# Patient Record
Sex: MALE | Race: WHITE | NOT HISPANIC OR LATINO | Employment: OTHER | URBAN - NONMETROPOLITAN AREA
[De-identification: names, ages, dates, MRNs, and addresses within clinical notes are randomized per-mention and may not be internally consistent; named-entity substitution may affect disease eponyms.]

---

## 2017-08-20 ENCOUNTER — APPOINTMENT (OUTPATIENT)
Dept: CT IMAGING | Facility: HOSPITAL | Age: 37
End: 2017-08-20

## 2017-08-20 ENCOUNTER — HOSPITAL ENCOUNTER (INPATIENT)
Facility: HOSPITAL | Age: 37
LOS: 2 days | Discharge: HOME | End: 2017-08-22
Attending: PSYCHIATRY & NEUROLOGY | Admitting: PSYCHIATRY & NEUROLOGY

## 2017-08-20 ENCOUNTER — HOSPITAL ENCOUNTER (EMERGENCY)
Facility: HOSPITAL | Age: 37
Discharge: PSYCHIATRIC OR SUBSTANCE USE FACILITY | End: 2017-08-20
Attending: EMERGENCY MEDICINE

## 2017-08-20 VITALS
BODY MASS INDEX: 27.98 KG/M2 | OXYGEN SATURATION: 100 % | DIASTOLIC BLOOD PRESSURE: 63 MMHG | SYSTOLIC BLOOD PRESSURE: 96 MMHG | TEMPERATURE: 98.2 F | HEART RATE: 92 BPM | RESPIRATION RATE: 16 BRPM | WEIGHT: 225 LBS | HEIGHT: 75 IN

## 2017-08-20 DIAGNOSIS — R45.851 SUICIDAL IDEATION: Primary | ICD-10-CM

## 2017-08-20 PROBLEM — F32.A DEPRESSION WITH SUICIDAL IDEATION: Status: ACTIVE | Noted: 2017-08-20

## 2017-08-20 PROBLEM — F31.81 BIPOLAR II DISORDER (HCC): Status: ACTIVE | Noted: 2017-08-20

## 2017-08-20 PROBLEM — F32.A DEPRESSION WITH SUICIDAL IDEATION: Status: RESOLVED | Noted: 2017-08-20 | Resolved: 2017-08-20

## 2017-08-20 LAB
6-ACETYL MORPHINE: NEGATIVE
ALBUMIN SERPL-MCNC: 4.6 G/DL (ref 3.5–5)
ALBUMIN/GLOB SERPL: 1.3 G/DL (ref 1.5–2.5)
ALP SERPL-CCNC: 83 U/L (ref 40–129)
ALT SERPL W P-5'-P-CCNC: 168 U/L (ref 10–44)
AMPHET+METHAMPHET UR QL: NEGATIVE
ANION GAP SERPL CALCULATED.3IONS-SCNC: 8.7 MMOL/L (ref 3.6–11.2)
AST SERPL-CCNC: 55 U/L (ref 10–34)
BARBITURATES UR QL SCN: NEGATIVE
BASOPHILS # BLD AUTO: 0.05 10*3/MM3 (ref 0–0.3)
BASOPHILS NFR BLD AUTO: 0.5 % (ref 0–2)
BENZODIAZ UR QL SCN: NEGATIVE
BILIRUB SERPL-MCNC: 0.4 MG/DL (ref 0.2–1.8)
BILIRUB UR QL STRIP: NEGATIVE
BUN BLD-MCNC: 15 MG/DL (ref 7–21)
BUN/CREAT SERPL: 12.1 (ref 7–25)
BUPRENORPHINE SERPL-MCNC: NEGATIVE NG/ML
CALCIUM SPEC-SCNC: 10 MG/DL (ref 7.7–10)
CANNABINOIDS SERPL QL: NEGATIVE
CHLORIDE SERPL-SCNC: 105 MMOL/L (ref 99–112)
CLARITY UR: CLEAR
CO2 SERPL-SCNC: 28.3 MMOL/L (ref 24.3–31.9)
COCAINE UR QL: NEGATIVE
COLOR UR: YELLOW
CREAT BLD-MCNC: 1.24 MG/DL (ref 0.43–1.29)
DEPRECATED RDW RBC AUTO: 42 FL (ref 37–54)
EOSINOPHIL # BLD AUTO: 0.18 10*3/MM3 (ref 0–0.7)
EOSINOPHIL NFR BLD AUTO: 1.8 % (ref 0–5)
ERYTHROCYTE [DISTWIDTH] IN BLOOD BY AUTOMATED COUNT: 12.6 % (ref 11.5–14.5)
ETHANOL BLD-MCNC: <10 MG/DL
ETHANOL UR QL: <0.01 %
GFR SERPL CREATININE-BSD FRML MDRD: 65 ML/MIN/1.73
GLOBULIN UR ELPH-MCNC: 3.5 GM/DL
GLUCOSE BLD-MCNC: 108 MG/DL (ref 70–110)
GLUCOSE UR STRIP-MCNC: NEGATIVE MG/DL
HCT VFR BLD AUTO: 45.7 % (ref 42–52)
HGB BLD-MCNC: 15.9 G/DL (ref 14–18)
HGB UR QL STRIP.AUTO: NEGATIVE
IMM GRANULOCYTES # BLD: 0.04 10*3/MM3 (ref 0–0.03)
IMM GRANULOCYTES NFR BLD: 0.4 % (ref 0–0.5)
KETONES UR QL STRIP: ABNORMAL
LEUKOCYTE ESTERASE UR QL STRIP.AUTO: NEGATIVE
LYMPHOCYTES # BLD AUTO: 3.54 10*3/MM3 (ref 1–3)
LYMPHOCYTES NFR BLD AUTO: 34.8 % (ref 21–51)
MCH RBC QN AUTO: 32.1 PG (ref 27–33)
MCHC RBC AUTO-ENTMCNC: 34.8 G/DL (ref 33–37)
MCV RBC AUTO: 92.1 FL (ref 80–94)
METHADONE UR QL SCN: NEGATIVE
MONOCYTES # BLD AUTO: 0.58 10*3/MM3 (ref 0.1–0.9)
MONOCYTES NFR BLD AUTO: 5.7 % (ref 0–10)
NEUTROPHILS # BLD AUTO: 5.77 10*3/MM3 (ref 1.4–6.5)
NEUTROPHILS NFR BLD AUTO: 56.8 % (ref 30–70)
NITRITE UR QL STRIP: NEGATIVE
OPIATES UR QL: NEGATIVE
OSMOLALITY SERPL CALC.SUM OF ELEC: 284.5 MOSM/KG (ref 273–305)
OXYCODONE UR QL SCN: NEGATIVE
PCP UR QL SCN: NEGATIVE
PH UR STRIP.AUTO: <=5 [PH] (ref 5–8)
PLATELET # BLD AUTO: 206 10*3/MM3 (ref 130–400)
PMV BLD AUTO: 10.5 FL (ref 6–10)
POTASSIUM BLD-SCNC: 4.3 MMOL/L (ref 3.5–5.3)
PROT SERPL-MCNC: 8.1 G/DL (ref 6–8)
PROT UR QL STRIP: NEGATIVE
RBC # BLD AUTO: 4.96 10*6/MM3 (ref 4.7–6.1)
SODIUM BLD-SCNC: 142 MMOL/L (ref 135–153)
SP GR UR STRIP: 1.02 (ref 1–1.03)
UROBILINOGEN UR QL STRIP: ABNORMAL
WBC NRBC COR # BLD: 10.16 10*3/MM3 (ref 4.5–12.5)

## 2017-08-20 PROCEDURE — 99223 1ST HOSP IP/OBS HIGH 75: CPT | Performed by: PSYCHIATRY & NEUROLOGY

## 2017-08-20 PROCEDURE — 70450 CT HEAD/BRAIN W/O DYE: CPT | Performed by: RADIOLOGY

## 2017-08-20 RX ORDER — METHOCARBAMOL 750 MG/1
750 TABLET, FILM COATED ORAL 4 TIMES DAILY PRN
Status: ON HOLD | COMMUNITY
End: 2017-08-20

## 2017-08-20 RX ORDER — TRAZODONE HYDROCHLORIDE 50 MG/1
100 TABLET ORAL NIGHTLY PRN
Status: DISCONTINUED | OUTPATIENT
Start: 2017-08-20 | End: 2017-08-22 | Stop reason: HOSPADM

## 2017-08-20 RX ORDER — BENZONATATE 100 MG/1
100 CAPSULE ORAL 3 TIMES DAILY PRN
Status: DISCONTINUED | OUTPATIENT
Start: 2017-08-20 | End: 2017-08-22 | Stop reason: HOSPADM

## 2017-08-20 RX ORDER — BENZTROPINE MESYLATE 1 MG/ML
0.5 INJECTION INTRAMUSCULAR; INTRAVENOUS DAILY PRN
Status: DISCONTINUED | OUTPATIENT
Start: 2017-08-20 | End: 2017-08-22 | Stop reason: HOSPADM

## 2017-08-20 RX ORDER — BENZTROPINE MESYLATE 1 MG/1
1 TABLET ORAL DAILY PRN
Status: DISCONTINUED | OUTPATIENT
Start: 2017-08-20 | End: 2017-08-22 | Stop reason: HOSPADM

## 2017-08-20 RX ORDER — METOCLOPRAMIDE HYDROCHLORIDE 5 MG/ML
10 INJECTION INTRAMUSCULAR; INTRAVENOUS ONCE
Status: COMPLETED | OUTPATIENT
Start: 2017-08-20 | End: 2017-08-20

## 2017-08-20 RX ORDER — LURASIDONE HYDROCHLORIDE 40 MG/1
20 TABLET, FILM COATED ORAL
Status: DISCONTINUED | OUTPATIENT
Start: 2017-08-20 | End: 2017-08-22 | Stop reason: HOSPADM

## 2017-08-20 RX ORDER — FAMOTIDINE 20 MG/1
20 TABLET, FILM COATED ORAL 2 TIMES DAILY PRN
Status: DISCONTINUED | OUTPATIENT
Start: 2017-08-20 | End: 2017-08-22 | Stop reason: HOSPADM

## 2017-08-20 RX ORDER — LOPERAMIDE HYDROCHLORIDE 2 MG/1
2 CAPSULE ORAL 4 TIMES DAILY PRN
Status: DISCONTINUED | OUTPATIENT
Start: 2017-08-20 | End: 2017-08-22 | Stop reason: HOSPADM

## 2017-08-20 RX ORDER — ALUMINA, MAGNESIA, AND SIMETHICONE 2400; 2400; 240 MG/30ML; MG/30ML; MG/30ML
15 SUSPENSION ORAL EVERY 6 HOURS PRN
Status: DISCONTINUED | OUTPATIENT
Start: 2017-08-20 | End: 2017-08-22 | Stop reason: HOSPADM

## 2017-08-20 RX ORDER — ACETAMINOPHEN 325 MG/1
650 TABLET ORAL EVERY 4 HOURS PRN
Status: DISCONTINUED | OUTPATIENT
Start: 2017-08-20 | End: 2017-08-22 | Stop reason: HOSPADM

## 2017-08-20 RX ORDER — IBUPROFEN 800 MG/1
800 TABLET ORAL EVERY 6 HOURS PRN
Status: ON HOLD | COMMUNITY
End: 2017-08-20

## 2017-08-20 RX ORDER — ECHINACEA PURPUREA EXTRACT 125 MG
2 TABLET ORAL AS NEEDED
Status: DISCONTINUED | OUTPATIENT
Start: 2017-08-20 | End: 2017-08-22 | Stop reason: HOSPADM

## 2017-08-20 RX ORDER — FLUTICASONE PROPIONATE 50 MCG
2 SPRAY, SUSPENSION (ML) NASAL DAILY
Status: ON HOLD | COMMUNITY
End: 2017-08-20

## 2017-08-20 RX ORDER — SODIUM CHLORIDE 0.9 % (FLUSH) 0.9 %
10 SYRINGE (ML) INJECTION AS NEEDED
Status: DISCONTINUED | OUTPATIENT
Start: 2017-08-20 | End: 2017-08-20 | Stop reason: HOSPADM

## 2017-08-20 RX ORDER — ONDANSETRON 4 MG/1
4 TABLET, FILM COATED ORAL EVERY 6 HOURS PRN
Status: DISCONTINUED | OUTPATIENT
Start: 2017-08-20 | End: 2017-08-22 | Stop reason: HOSPADM

## 2017-08-20 RX ORDER — HYDROXYZINE 50 MG/1
50 TABLET, FILM COATED ORAL EVERY 6 HOURS PRN
Status: DISCONTINUED | OUTPATIENT
Start: 2017-08-20 | End: 2017-08-22 | Stop reason: HOSPADM

## 2017-08-20 RX ORDER — NICOTINE 21 MG/24HR
1 PATCH, TRANSDERMAL 24 HOURS TRANSDERMAL DAILY
Status: DISCONTINUED | OUTPATIENT
Start: 2017-08-20 | End: 2017-08-21

## 2017-08-20 RX ORDER — KETOROLAC TROMETHAMINE 30 MG/ML
30 INJECTION, SOLUTION INTRAMUSCULAR; INTRAVENOUS ONCE
Status: COMPLETED | OUTPATIENT
Start: 2017-08-20 | End: 2017-08-20

## 2017-08-20 RX ADMIN — METOCLOPRAMIDE 10 MG: 5 INJECTION, SOLUTION INTRAMUSCULAR; INTRAVENOUS at 07:32

## 2017-08-20 RX ADMIN — NICOTINE 1 PATCH: 21 PATCH TRANSDERMAL at 18:27

## 2017-08-20 RX ADMIN — ACETAMINOPHEN 650 MG: 325 TABLET ORAL at 18:27

## 2017-08-20 RX ADMIN — KETOROLAC TROMETHAMINE 30 MG: 30 INJECTION, SOLUTION INTRAMUSCULAR at 07:27

## 2017-08-20 RX ADMIN — LURASIDONE HYDROCHLORIDE 20 MG: 40 TABLET, FILM COATED ORAL at 20:49

## 2017-08-20 RX ADMIN — HYDROXYZINE HYDROCHLORIDE 50 MG: 50 TABLET ORAL at 22:07

## 2017-08-20 RX ADMIN — TRAZODONE HYDROCHLORIDE 100 MG: 50 TABLET ORAL at 22:07

## 2017-08-20 RX ADMIN — SODIUM CHLORIDE 1000 ML: 9 INJECTION, SOLUTION INTRAVENOUS at 07:25

## 2017-08-20 NOTE — NURSING NOTE
Patient presented to ER with headache and suicidal thoughts. Has been medically cleared and treated for his headache with fair result.  Patient has diagnosis of Depression, Bipolar and schizophrenia and not under treatment at this time. Has had martial strain recently. Wife in pushing him to give his money to her mother for a down payment on a home that is next to theirs. Patient does not want mother in law to live next to them. 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 His a 7th degree  and 4th degree Jujitsu. Has had a suicide attempt in the past when he was unable to get to a hosptal for mental health treatment. Attempt was years ago, cut his right wrist latterly and tried to pull out his artery. Was found by his mother and then treated. Last hospitalization was more than 5 years ago. Rates Anxiety and Depression both 7/10. Patient shows good insight and states he know he needs to be hospitalized or he will hurt himself. Will not disclose a plan. No substance abuse or alcohol use.Appetite and sleep are poor. Patient is cooperative and has good judgement and insight to his mental illness.

## 2017-08-20 NOTE — NURSING NOTE
Patient to intake per ED staff.  Pockets emptied and through search complete.  Patient searched by intake nurse and witnessed (OPHELIA Caldwell) per ED Policy 100.  Belongings logged on Personal Belongings Inventory Sheet.  Patient placed in hospital attire.  Room swept for any potential safety hazards, room cleared, and patient placed in treatment room.  Patient ready for evaluation.

## 2017-08-20 NOTE — ED NOTES
Patient reports having the same headache for the past 7 days. Patient reports the HA hurts behind his eyes and radiates to the back of his neck. Patient reports he is a  and is used to taking hits to his head, but this pain is different. Patient reports he has had a terrible week and just wants to get help before he hurts himself.  Patient resting quietly on stretcher. Pt AAOx4. No respiratory distress noted. Respirations even and unlabored. Pt denies any needs at this time. Skin PWD. Will continue to monitor and follow plan of care. Bed rails up x 2, bed in lowest position, call light within reach.      Ivett Kitchen RN  08/20/17 4049

## 2017-08-20 NOTE — NURSING NOTE
Patient educated on bathroom location, order to obtain clean-catch urine and provided labeled specimen container.

## 2017-08-20 NOTE — ED PROVIDER NOTES
Subjective   Patient is a 36 y.o. male presenting with mental health disorder and headaches.   History provided by:  Patient   used: No    Mental Health Problem   Presenting symptoms: depression, suicidal thoughts and suicidal threats    Degree of incapacity (severity):  Moderate  Onset quality:  Gradual  Timing:  Intermittent  Progression:  Waxing and waning  Chronicity:  New  Context: stressful life event    Treatment compliance:  Untreated  Relieved by:  Nothing  Worsened by:  Nothing  Ineffective treatments:  None tried  Associated symptoms: anhedonia, anxiety, feelings of worthlessness, headaches, insomnia, irritability and poor judgment    Associated symptoms: no abdominal pain    Headache   Pain location:  L temporal  Quality:  Sharp and stabbing  Radiates to:  Does not radiate  Severity currently:  8/10  Severity at highest:  8/10  Onset quality:  Gradual  Timing:  Constant  Progression:  Worsening  Chronicity:  New  Similar to prior headaches: no    Context: not activity, not exposure to bright light, not caffeine, not defecating, not eating, not intercourse and not loud noise    Context comment:  Head injury 3 weeks ago while fighting martial arts  Relieved by:  Nothing  Worsened by:  Nothing  Ineffective treatments:  None tried  Associated symptoms: no abdominal pain, no back pain, no eye pain, no facial pain, no near-syncope, no neck pain, no sore throat, no swollen glands, no syncope and no tingling    Risk factors: no anger, does not have insomnia and lifestyle not sedentary        Review of Systems   Constitutional: Positive for irritability.   HENT: Negative for sore throat.    Eyes: Negative.  Negative for pain.   Respiratory: Negative.    Cardiovascular: Negative.  Negative for syncope and near-syncope.   Gastrointestinal: Negative.  Negative for abdominal pain.   Endocrine: Negative.    Musculoskeletal: Negative.  Negative for back pain and neck pain.   Skin: Negative.     Allergic/Immunologic: Negative.    Neurological: Positive for headaches.   Hematological: Negative.    Psychiatric/Behavioral: Positive for suicidal ideas. The patient is nervous/anxious and has insomnia.        No past medical history on file.    Allergies   Allergen Reactions   • Penicillins Rash       No past surgical history on file.    No family history on file.    Social History     Social History   • Marital status: Unknown     Spouse name: N/A   • Number of children: N/A   • Years of education: N/A     Social History Main Topics   • Smoking status: Not on file   • Smokeless tobacco: Not on file   • Alcohol use Not on file   • Drug use: Not on file   • Sexual activity: Not on file     Other Topics Concern   • Not on file     Social History Narrative           Objective   Physical Exam   Constitutional: He is oriented to person, place, and time. He appears well-developed and well-nourished.   HENT:   Head: Normocephalic and atraumatic.   Nose: Nose normal.   Mouth/Throat: Oropharynx is clear and moist.   Eyes: EOM are normal. Pupils are equal, round, and reactive to light.   Neck: Normal range of motion. Neck supple.   Cardiovascular: Normal rate, regular rhythm, normal heart sounds and intact distal pulses.    Pulmonary/Chest: Effort normal and breath sounds normal.   Abdominal: Soft. Bowel sounds are normal.   Musculoskeletal: Normal range of motion.   Neurological: He is alert and oriented to person, place, and time.   Skin: Skin is warm and dry.   Psychiatric:   Depressed affect, reports severe depression and suicidal thoughts. Denies specific plan   Nursing note and vitals reviewed.      Procedures         ED Course  ED Course   Comment By Time   Care transferred to JULIAN Mace 08/20 0757                  [facility]    Final diagnoses:   None            Meagan Cummins, JULIAN  08/20/17 0758

## 2017-08-20 NOTE — PLAN OF CARE
Problem: BH Patient Care Overview (Adult)  Goal: Plan of Care Review  Outcome: Ongoing (interventions implemented as appropriate)    08/20/17 1053   Coping/Psychosocial Response Interventions   Plan Of Care Reviewed With patient   Coping/Psychosocial   Patient Agreement with Plan of Care agrees   Outcome Evaluation   Outcome Summary/Follow up Plan new admit see nurse note.

## 2017-08-20 NOTE — H&P
Scrib: Ankita Smi Capobianco  Admission Date: 8/20/2017  3:02 PM 08/20/17      Subjective     Chief Complaint:  Suicidal ideation along with a severe headache     HPI:  Aguilar Best is a 36 y.o. male who was admitted for safety precautions and treatment for suicidal thoughts and fear if he didn't seek help he would hurt someone else. Patient reports he has had a terrible week and just wants to get help before he hurts himself. Patient shares that he has become increasingly more depressed and anxious reporting poor sleep, decreased interest, worthlessness, decreased concentration and focus, feeling hopeless helpless and worthless and had suicidal thoughts and decided seeking treatment would be his best option.  He worries excessively he is very restless and edgy feels keyed up and muscle tension.      He does endorse symptoms of having panic attack since occasionally where he has difficulty catching his breath feeling tightness in his chest fear that he might be dying.    He currently denies: Grandiosity, increased goal-directed activity, mood swings, auditory visual hallucinations, suspiciousness, paranoia, despite previous psychiatric history.  He also denies current symptoms of OCD, any past abuse physical sexual or mental as an adult or child.      Screening questions reveals a history of episodes of increased energy, euphoria, decreased need for sleep, lasting at least 3-4 days in a row.      Past Psych History: [facility] approximately 2005 followed up in [place].  Past diagnoses including depression, bipolar disorder, schizophrenia although he has not been in any treatment for many years.One Suicide Attempt was years ago, cut his right wrist latterly and tried to pull out his artery. Was found by his mother and then treated. Last hospitalization was more than 5 years ago    Substance Abuse: Denies any current illicit substance use.    Family History:  family history includes Bipolar disorder in his  mother.    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  By report his wife is trying to take all of his money and have his mother-in-law moved in next door to them which she is refusing.  Wife in pushing him to give his money to her mother for a down payment on a home that is next to theirs. Patient does not want mother in law to live next to them. 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 His a 7th degree  and 4th degree Jujitsu He shares that he thinks he will leave her because the marriage is not healthy for him.  He reports he has family especially her brother who is supportive of him    Medical/Surgical History:  Past Medical History:   Diagnosis Date   • Anxiety    • Asthma    • Bipolar disorder    • Depression    • Migraine    • Psychiatric illness    • Schizoaffective disorder    • Schizoaffective schizophrenia    • Suicide attempt      Past Surgical History:   Procedure Laterality Date   • APPENDECTOMY     • CHOLECYSTECTOMY         Allergies   Allergen Reactions   • Penicillins Rash     Social History   Substance Use Topics   • Smoking status: Former Smoker   • Smokeless tobacco: Current User     Types: Snuff   • Alcohol use No     Current Medications:   Current Facility-Administered Medications   Medication Dose Route Frequency Provider Last Rate Last Dose   • acetaminophen (TYLENOL) tablet 650 mg  650 mg Oral Q4H PRN Sebas Bledsoe MD       • aluminum-magnesium hydroxide-simethicone (MAALOX MAX) 400-400-40 MG/5ML suspension 15 mL  15 mL Oral Q6H PRN Sebas Bledsoe MD       • benzonatate (TESSALON) capsule 100 mg  100 mg Oral TID PRN Sebas Bledsoe MD       • benztropine (COGENTIN) tablet 1 mg  1 mg Oral Daily PRN Sebas Bledsoe MD        Or   • benztropine (COGENTIN) injection 0.5 mg  0.5 mg Intramuscular Daily PRN Sebas CANCINO  MD Ladi       • famotidine (PEPCID) tablet 20 mg  20 mg Oral BID PRN Sebas Bledsoe MD       • hydrOXYzine (ATARAX) tablet 50 mg  50 mg Oral Q6H PRN Sebas Bledsoe MD       • loperamide (IMODIUM) capsule 2 mg  2 mg Oral 4x Daily PRN Sebas Bledsoe MD       • magnesium hydroxide (MILK OF MAGNESIA) suspension 2400 mg/10mL 10 mL  10 mL Oral Daily PRN Sebas Bledsoe MD       • ondansetron (ZOFRAN) tablet 4 mg  4 mg Oral Q6H PRN Sebas Bledsoe MD       • sodium chloride (OCEAN) nasal spray 2 spray  2 spray Each Nare PRN Sebas Bledsoe MD       • traZODone (DESYREL) tablet 100 mg  100 mg Oral Nightly PRN Sebas Bledsoe MD               Review of Systems - General ROS: negative for - chills, fever or malaise  Ophthalmic ROS: negative for - loss of vision  ENT ROS: negative for - hearing change  Allergy and Immunology ROS: negative for - hives  Hematological and Lymphatic ROS: negative for - bleeding problems  Endocrine ROS: negative for - skin changes  Respiratory ROS: no cough, shortness of breath, or wheezing  Cardiovascular ROS: no chest pain or dyspnea on exertion  Gastrointestinal ROS: no abdominal pain, change in bowel habits, or black or bloody stools  Genito-Urinary ROS: no dysuria, trouble voiding, or hematuria  Musculoskeletal ROS: negative for - gait disturbance    Neurological ROS:     Dermatological ROS: negative for rash    Objective       General Appearance:    Alert, cooperative, in no acute distress   Head:    Normocephalic, without obvious abnormality, atraumatic   Eyes:            Lids and lashes normal, conjunctivae and sclerae normal, no   icterus, no pallor, corneas clear, PERRLA   Ears:    Ears appear intact with no abnormalities noted   Throat:   No oral lesions, no thrush, oral mucosa moist   Neck:   No adenopathy, supple, trachea midline, no thyromegaly, no   carotid bruit, no JVD   Back:     No kyphosis present, no scoliosis present, no skin lesions,      erythema or scars, no  "tenderness to percussion or                   palpation,   range of motion normal   Lungs:     Clear to auscultation,respirations regular, even and                  unlabored    Heart:    Regular rhythm and normal rate, normal S1 and S2, no            murmur, no gallop, no rub, no click   Chest Wall:    No abnormalities observed   Abdomen:     Normal bowel sounds, no masses, no organomegaly, soft        non-tender, non-distended, no guarding, no rebound                tenderness   Rectal:     Deferred   Extremities:   Moves all extremities well, no edema, no cyanosis, no             redness   Pulses:   Pulses palpable and equal bilaterally   Skin:   No bleeding, bruising or rash   Lymph nodes:   No palpable adenopathy     Neurologic:     Cranial nerves 2 - 12 grossly intact, sensation intact, DTR       present and equal bilaterally       Blood pressure 114/78, pulse 86, temperature 97.6 °F (36.4 °C), temperature source Temporal Artery , resp. rate 18, height 75\" (190.5 cm), weight 221 lb 12.8 oz (101 kg), SpO2 97 %.    Mental Status Exam:   Hygiene:   good  Cooperation:  Cooperative  Eye Contact:  Fair  Psychomotor Behavior:  Slow  Affect:  Restricted  Hopelessness: 7  Speech:  Normal  Thought Process:  Linear  Thought Content:  Mood incongruent  Suicidal:  Suicidal Ideation  Homicidal:  None  Hallucinations:  None  Delusion:  None  Memory:  Intact  Orientation:  Person, Place, Time and Situation  Reliability:  fair  Insight:  Fair  Judgement:  Fair  Impulse Control:  Fair  Physical/Medical Issues:  Yes Headach    Medical Decision Making:   Labs:  Urine Drug screen normal     Color, UA Yellow, Straw Yellow   Appearance, UA Clear Clear   pH, UA 5.0 - 8.0 <=5.0   Specific Gravity, UA 1.005 - 1.030 1.023   Glucose, UA Negative Negative   Ketones, UA Negative Trace (A)   Bilirubin, UA Negative Negative   Blood, UA Negative Negative   Protein, UA Negative Negative   Leuk Esterase, UA Negative Negative   Nitrite, UA " Negative Negative   Urobilinogen, UA 0.2 - 1.0 E.U./dL 1.0 E.U./dL         Glucose   Date Value Ref Range Status   08/20/2017 108 70 - 110 mg/dL Final     Sodium   Date Value Ref Range Status   08/20/2017 142 135 - 153 mmol/L Final     Potassium   Date Value Ref Range Status   08/20/2017 4.3 3.5 - 5.3 mmol/L Final     CO2   Date Value Ref Range Status   08/20/2017 28.3 24.3 - 31.9 mmol/L Final     Chloride   Date Value Ref Range Status   08/20/2017 105 99 - 112 mmol/L Final     Anion Gap   Date Value Ref Range Status   08/20/2017 8.7 3.6 - 11.2 mmol/L Final     Creatinine   Date Value Ref Range Status   08/20/2017 1.24 0.43 - 1.29 mg/dL Final     BUN   Date Value Ref Range Status   08/20/2017 15 7 - 21 mg/dL Final     BUN/Creatinine Ratio   Date Value Ref Range Status   08/20/2017 12.1 7.0 - 25.0 Final     Calcium   Date Value Ref Range Status   08/20/2017 10.0 7.7 - 10.0 mg/dL Final     eGFR Non  Amer   Date Value Ref Range Status   08/20/2017 65 >60 mL/min/1.73 Final     Alkaline Phosphatase   Date Value Ref Range Status   08/20/2017 83 40 - 129 U/L Final     Comment:     Note New Reference Ranges     Total Protein   Date Value Ref Range Status   08/20/2017 8.1 (H) 6.0 - 8.0 g/dL Final     ALT (SGPT)   Date Value Ref Range Status   08/20/2017 168 (H) 10 - 44 U/L Final     AST (SGOT)   Date Value Ref Range Status   08/20/2017 55 (H) 10 - 34 U/L Final     Total Bilirubin   Date Value Ref Range Status   08/20/2017 0.4 0.2 - 1.8 mg/dL Final     Albumin   Date Value Ref Range Status   08/20/2017 4.60 3.50 - 5.00 g/dL Final     Globulin   Date Value Ref Range Status   08/20/2017 3.5 gm/dL Final     A/G Ratio   Date Value Ref Range Status   08/20/2017 1.3 (L) 1.5 - 2.5 g/dL Final     WBC   Date Value Ref Range Status   08/20/2017 10.16 4.50 - 12.50 10*3/mm3 Final     RBC   Date Value Ref Range Status   08/20/2017 4.96 4.70 - 6.10 10*6/mm3 Final     Hemoglobin   Date Value Ref Range Status   08/20/2017 15.9 14.0 -  18.0 g/dL Final     Hematocrit   Date Value Ref Range Status   08/20/2017 45.7 42.0 - 52.0 % Final     MCV   Date Value Ref Range Status   08/20/2017 92.1 80.0 - 94.0 fL Final     MCH   Date Value Ref Range Status   08/20/2017 32.1 27.0 - 33.0 pg Final     MCHC   Date Value Ref Range Status   08/20/2017 34.8 33.0 - 37.0 g/dL Final     RDW   Date Value Ref Range Status   08/20/2017 12.6 11.5 - 14.5 % Final     RDW-SD   Date Value Ref Range Status   08/20/2017 42.0 37.0 - 54.0 fl Final     MPV   Date Value Ref Range Status   08/20/2017 10.5 (H) 6.0 - 10.0 fL Final     Platelets   Date Value Ref Range Status   08/20/2017 206 130 - 400 10*3/mm3 Final     Neutrophil %   Date Value Ref Range Status   08/20/2017 56.8 30.0 - 70.0 % Final     Lymphocyte %   Date Value Ref Range Status   08/20/2017 34.8 21.0 - 51.0 % Final     Monocyte %   Date Value Ref Range Status   08/20/2017 5.7 0.0 - 10.0 % Final     Eosinophil %   Date Value Ref Range Status   08/20/2017 1.8 0.0 - 5.0 % Final     Basophil %   Date Value Ref Range Status   08/20/2017 0.5 0.0 - 2.0 % Final     Immature Grans %   Date Value Ref Range Status   08/20/2017 0.4 0.0 - 0.5 % Final     Neutrophils, Absolute   Date Value Ref Range Status   08/20/2017 5.77 1.40 - 6.50 10*3/mm3 Final     Lymphocytes, Absolute   Date Value Ref Range Status   08/20/2017 3.54 (H) 1.00 - 3.00 10*3/mm3 Final     Monocytes, Absolute   Date Value Ref Range Status   08/20/2017 0.58 0.10 - 0.90 10*3/mm3 Final     Eosinophils, Absolute   Date Value Ref Range Status   08/20/2017 0.18 0.00 - 0.70 10*3/mm3 Final     Basophils, Absolute   Date Value Ref Range Status   08/20/2017 0.05 0.00 - 0.30 10*3/mm3 Final     Immature Grans, Absolute   Date Value Ref Range Status   08/20/2017 0.04 (H) 0.00 - 0.03 10*3/mm3 Final         Medications:        •  acetaminophen  •  aluminum-magnesium hydroxide-simethicone  •  benzonatate  •  benztropine **OR** benztropine  •  famotidine  •  hydrOXYzine  •   loperamide  •  magnesium hydroxide  •  ondansetron  •  sodium chloride  •  traZODone   All medications reviewed.    Special Precautions: Continue current level of Special Precautions.    Assessment/Plan     Active Problems:    Bipolar II disorder        Patient admitted for safety and stabilization and placed on the appropriate level of precautions.  Patient will be assigned a Masters level therapist and encouraged to participate in both individual and group therapy on the unit.  Routine labs have been ordered.    Start trial of latuda 20mg Daily.   He has previously tried and failed: Seroquel (side effects), Lamotrigine (side effects), numerous SSRI's (side effects), so should be able to get a PA for latuda.    Discussed R/B/SE/Tx alternatives.    Further treatment planning as per course.      Attestation:      Ankita KWOK, MANDIEW, [facility], acted as scribe for Dr. Sebas Bledsoe.

## 2017-08-20 NOTE — DISCHARGE INSTRUCTIONS

## 2017-08-21 PROCEDURE — 99232 SBSQ HOSP IP/OBS MODERATE 35: CPT | Performed by: PSYCHIATRY & NEUROLOGY

## 2017-08-21 RX ADMIN — ACETAMINOPHEN 650 MG: 325 TABLET ORAL at 17:41

## 2017-08-21 RX ADMIN — TRAZODONE HYDROCHLORIDE 100 MG: 50 TABLET ORAL at 21:54

## 2017-08-21 RX ADMIN — NICOTINE POLACRILEX 2 MG: 2 GUM, CHEWING ORAL at 21:15

## 2017-08-21 RX ADMIN — FAMOTIDINE 20 MG: 20 TABLET, FILM COATED ORAL at 21:15

## 2017-08-21 RX ADMIN — HYDROXYZINE HYDROCHLORIDE 50 MG: 50 TABLET ORAL at 21:54

## 2017-08-21 RX ADMIN — LURASIDONE HYDROCHLORIDE 20 MG: 40 TABLET, FILM COATED ORAL at 17:41

## 2017-08-21 RX ADMIN — ACETAMINOPHEN 650 MG: 325 TABLET ORAL at 21:11

## 2017-08-21 RX ADMIN — NICOTINE 1 PATCH: 21 PATCH TRANSDERMAL at 09:20

## 2017-08-21 NOTE — DISCHARGE INSTR - APPOINTMENTS
Dr. Cuevas  [address] [place] [address]  [address]  [facility]   [phone]    September 21st, 2017 @ 2:45pm

## 2017-08-21 NOTE — PLAN OF CARE
Problem:  Patient Care Overview (Adult)  Goal: Plan of Care Review  Outcome: Ongoing (interventions implemented as appropriate)    08/21/17 1922   Coping/Psychosocial Response Interventions   Plan Of Care Reviewed With patient   Coping/Psychosocial   Patient Agreement with Plan of Care agrees   Outcome Evaluation   Outcome Summary/Follow up Plan Patient very quiet and isolates in room most of the shift. Patient denies thoughts to harm self today. Appetite is good and sleep is fair with difficulty falling asleep at night noted. Will continue to monitor.   Patient Care Overview   Progress no change       Goal: Interdisciplinary Rounds/Family Conference  Outcome: Ongoing (interventions implemented as appropriate)  Goal: Individualization and Mutuality  Outcome: Ongoing (interventions implemented as appropriate)  Goal: Discharge Needs Assessment  Outcome: Ongoing (interventions implemented as appropriate)    Problem:  Overarching Goals  Goal: Adheres to Safety Considerations for Self and Others  Outcome: Ongoing (interventions implemented as appropriate)  Goal: Optimized Coping Skills in Response to Life Stressors  Outcome: Ongoing (interventions implemented as appropriate)  Goal: Develops/Participates in Therapeutic [place] to Support Successful Transition  Outcome: Ongoing (interventions implemented as appropriate)

## 2017-08-21 NOTE — PLAN OF CARE
"Problem:  Patient Care Overview (Adult)  Goal: Discharge Needs Assessment  Outcome: Ongoing (interventions implemented as appropriate)    08/21/17 3322   Discharge Needs Assessment   Concerns To Be Addressed coping/stress concerns;relationship concerns   Readmission Within The Last 30 Days no previous admission in last 30 days   Community Agency Name(S) Requesting to follow up with Dr. Cuevas   Discharge Planning Comments The patient request follow up with Dr. Cuevas in [place], [place]. He did not report any problems with transportation or access to medications.    Discharge Needs Assessment   Outpatient/Agency/Support Group Needs outpatient medication management;outpatient counseling   Anticipated Discharge Disposition home or self-care   Discharge Disposition home or self-care   D) Introduced myself to patient as his therapist.  The patient was agreeable and we met one to one for assessment, treatment planning and aftercare planning purposes.  The patient was somewhat difficult to follow in terms of what exactly triggered his thoughts of suicide.  He was very relaxed and did not show signs of significant distress as he talked about events leading up to admission.  He reports that his wife( 6 months) and her family have been asking him for money and he knows that he will never see the money again if he gives it to them.  He stated that the money issue had stressed him out and he is now thinking about leaving his wife and asking for a divorce. He reports that he did not have thoughts of suicide until the day he came to the ER.  The only other problem that day was a \"severe headache\".  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  He has told other staff that he made a significant amount of money recently.  This may have triggered the spouse and her family to be asking for money.  The patient says that he does stay very anxious and his hands shake and \"people notice this all the time\".  He " has problems sleeping and episodic mood problems that are sometimes accompanied by S.I.  He denies any problems with drugs or alcohol. He is requesting follow up with Dr. Cuevas in [place], [place].    A) The patient reported that he was doing well today.  He has been observed interacting with peers and did not show signs of distress during our session.  Some of his self report appears questionable and he did not want to involve his wife or family, but did identify a close friend that he would be willing to involve if it was ordered or necessary.    P) Anticipate a short stay.  Follow up at Dr. Cuevas's office in [place], [place]

## 2017-08-21 NOTE — PLAN OF CARE
Problem: BH Patient Care Overview (Adult)  Goal: Plan of Care Review  Outcome: Ongoing (interventions implemented as appropriate)    08/21/17 0454   Coping/Psychosocial Response Interventions   Plan Of Care Reviewed With patient   Coping/Psychosocial   Patient Agreement with Plan of Care agrees   Outcome Evaluation   Outcome Summary/Follow up Plan new patient

## 2017-08-21 NOTE — PROGRESS NOTES
"      Inpatient Psy Progress Note     Admission Date: 8/20/2017  5:32 PM 08/21/17    Behavioral Health Treatment Plan and Problem List: I have reviewed and approved the Behavioral Health Treatment Plan and Problem list.    Allergies  Allergies   Allergen Reactions   • Penicillins Rash       Hospital Day: 1 day      Assessment completed within view of staff    History  CC: inpatient followup  Interval HPI: Patient seen and evaluated by me.  Chart reviewed. The patient states that he is feeling better. States that the medication is helping and he was able to sleep better last night. He states the is feeling anxious some. He would like to take nicotine gum as the patch is not helping.    Interval Review of Systems:   General ROS: negative for - fever or malaise  Endocrine ROS: negative for - palpitations  Respiratory ROS: no cough, shortness of breath, or wheezing  Cardiovascular ROS: no chest pain or dyspnea on exertion  Gastrointestinal ROS: no abdominal pain,no black or bloody stools    /88 (BP Location: Right arm, Patient Position: Sitting)  Pulse 79  Temp 97.6 °F (36.4 °C) (Temporal Artery )   Resp 18  Ht 75\" (190.5 cm)  Wt 221 lb 12.8 oz (101 kg)  SpO2 96%  BMI 27.72 kg/m2    Mental Status Exam  Mood: normal  Affect: normal  Thought Processes: linear, logical, and goal directed  Thought Content:  Normal  Hallucinations: no  Suicidal Thoughts:  none  Suicidal Plan/Intent:none  Hopelesness: no  Homicidal Thoughts:  absent      Medical Decision Making:   Labs:     Lab Results (last 24 hours)     ** No results found for the last 24 hours. **            Radiology:     Imaging Results (last 24 hours)     ** No results found for the last 24 hours. **            EKG:     ECG/EMG Results (most recent)     None           Medications:     lurasidone 20 mg Oral Daily With Dinner   nicotine 1 patch Transdermal Daily          All medications reviewed.      Assessment:    Active Problems:    Bipolar II disorder: " Continue Latuda 20 mg daily      Nicotine dependence: change nicoderm patch to nicorette gum.

## 2017-08-22 VITALS
SYSTOLIC BLOOD PRESSURE: 129 MMHG | WEIGHT: 221.8 LBS | HEIGHT: 75 IN | BODY MASS INDEX: 27.58 KG/M2 | HEART RATE: 81 BPM | DIASTOLIC BLOOD PRESSURE: 81 MMHG | RESPIRATION RATE: 20 BRPM | TEMPERATURE: 97.6 F | OXYGEN SATURATION: 96 %

## 2017-08-22 PROCEDURE — 99238 HOSP IP/OBS DSCHRG MGMT 30/<: CPT | Performed by: PSYCHIATRY & NEUROLOGY

## 2017-08-22 RX ORDER — LURASIDONE HYDROCHLORIDE 20 MG/1
20 TABLET, FILM COATED ORAL
Qty: 30 TABLET | Refills: 0 | Status: SHIPPED | OUTPATIENT
Start: 2017-08-22

## 2017-08-22 RX ADMIN — NICOTINE POLACRILEX 2 MG: 2 GUM, CHEWING ORAL at 13:50

## 2017-08-22 RX ADMIN — LURASIDONE HYDROCHLORIDE 20 MG: 40 TABLET, FILM COATED ORAL at 17:06

## 2017-08-22 NOTE — PLAN OF CARE
"Problem: BH Patient Care Overview (Adult)  Goal: Plan of Care Review  Outcome: Ongoing (interventions implemented as appropriate)    08/22/17 0401   Coping/Psychosocial Response Interventions   Plan Of Care Reviewed With patient   Coping/Psychosocial   Patient Agreement with Plan of Care agrees   Outcome Evaluation   Outcome Summary/Follow up Plan Patient stayed in his room most of the evening. Denied thoughts of harming himself. stated anxiety and drepression to be \" 0 \" Stated appetite to vinod, \"Good.\"   Patient Care Overview   Progress no change           "

## 2017-08-22 NOTE — DISCHARGE SUMMARY
Date of Discharge:  8/22/2017    Discharge Diagnosis:Active Problems:    Bipolar II disorder        Presenting Problem/History of Present Illness  Depression with suicidal ideation [F32.9, R49.473]     Hospital Course  Patient is a 36 y.o. male presented with severe depression and suicidal ideations. The patient was admitted to the [facility] AE 2 unit for safety, further evaluation and treatment.  The patient reported episodes of depression and hypomania and was diagnosed with bipolar II disorder and started on Latuda. He reported that the medication helped him and he was able to get some good rest.   The patient was also able to take part in individual and group counseling sessions and work on appropriate coping skills.  The patient made steady improvement in his mood and expressed feeling more positive and hopeful about future. Sleep and appetite were improved.  The day of discharge the patient was calm, cooperative and pleasant. Mood was reported to be good, and denied SI/HI/AVH. Also reported no medication side effects.        Procedures Performed         Consults:   Consults     No orders found from 7/22/2017 to 8/21/2017.          Pertinent Test Results: CBC, CMP, UA and UDS were unremarkable.      Condition on Discharge:  stable    Vital Signs  Temp:  [97.3 °F (36.3 °C)-98.2 °F (36.8 °C)] 98.2 °F (36.8 °C)  Heart Rate:  [68-98] 68  Resp:  [18] 18  BP: (137-148)/(77-88) 144/77      Discharge Disposition  Home or Self Care    Discharge Medications   Aguilar Best   Home Medication Instructions BEE:[id number]    Printed on:08/22/17 3691   Medication Information                      lurasidone (LATUDA) 20 MG tablet tablet  Take 1 tablet by mouth Daily With Dinner.                 Discharge Diet: Regular    Activity at Discharge: As tolerated    Follow-up Appointments  Dr Cuevas in [place], [place]         Haven Barnes MD  08/22/17  2:56 PM

## 2017-08-22 NOTE — PROGRESS NOTES
Bridge Session  Date: 8/22/2017   Time:  9664-3594    Data:  Reason for Inpatient Admission:  Depression and suicidal ideation    Aftercare:Dr. Cuevas                  [address]                 [address]  [facility]                   [phone]                   September 21st, 2017 @ 2:45pm  (earliest available appointment)    Coping Skills to Utilize: Fighting, go to the gym, exercise    Crisis Safety Plan:  • Support System to utilize and contact numbers: Jolly-friend, Mario Ross-friend, Mario's Wife is also supportive, brothers    • Educated on crisis hotline numbers (yes/no): yes    • Was the Patient made aware of contact information for the following: community mental health centers, crisis stabilization programs, residential programs, , etc (yes/no): yes    • Will transportation be a barrier (yes/no): no    • If so, explain solution(s) to resolve barrier: N/A    • How and where will the patient obtain prescribed medications: [facility]    Assessment: patient denies suicidal ideation and denies homicidal ideation. Patient reports he is ready to discharge home.  Patient is scheduled with Dr. Cuevas for outpatient at his request.    Plan:    Discussed the importance of follow up treatment for continuity of care. The Patient was able to verbalize understanding and commitment to the individualized aftercare and crisis safety plan.

## 2017-11-08 ENCOUNTER — APPOINTMENT (OUTPATIENT)
Dept: CT IMAGING | Facility: HOSPITAL | Age: 37
End: 2017-11-08

## 2017-11-08 ENCOUNTER — HOSPITAL ENCOUNTER (EMERGENCY)
Facility: HOSPITAL | Age: 37
Discharge: HOME | End: 2017-11-09
Attending: EMERGENCY MEDICINE | Admitting: EMERGENCY MEDICINE

## 2017-11-08 DIAGNOSIS — S09.90XA CLOSED HEAD INJURY, INITIAL ENCOUNTER: Primary | ICD-10-CM

## 2017-11-08 PROCEDURE — 99282 EMERGENCY DEPT VISIT SF MDM: CPT

## 2017-11-08 PROCEDURE — 70486 CT MAXILLOFACIAL W/O DYE: CPT

## 2017-11-08 PROCEDURE — 70486 CT MAXILLOFACIAL W/O DYE: CPT | Performed by: RADIOLOGY

## 2017-11-08 RX ORDER — NAPROXEN 500 MG/1
500 TABLET ORAL 2 TIMES DAILY PRN
Qty: 22 TABLET | Refills: 0 | Status: SHIPPED | OUTPATIENT
Start: 2017-11-08

## 2017-11-09 VITALS
TEMPERATURE: 97.8 F | OXYGEN SATURATION: 98 % | DIASTOLIC BLOOD PRESSURE: 74 MMHG | RESPIRATION RATE: 16 BRPM | SYSTOLIC BLOOD PRESSURE: 103 MMHG | HEIGHT: 75 IN | HEART RATE: 98 BPM | WEIGHT: 200 LBS | BODY MASS INDEX: 24.87 KG/M2
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
## 2017-11-09 NOTE — ED NOTES
Upon discharge instructions Pt states that he cannot take naproxen for pain. Pt states that last time he took naproxen he had constant episodes of vomitting. Dr. Ramirez notified and verbal orders to discontinue discharge prescription of naproxen and verbal orders with read back for the patient to take Tylenol as needed for pain at the patient's discretion. Pt verbalized understanding.      Katelynn Price RN  11/09/17 0003

## 2018-08-12 ENCOUNTER — APPOINTMENT (OUTPATIENT)
Dept: CT IMAGING | Facility: HOSPITAL | Age: 38
End: 2018-08-12

## 2018-08-12 ENCOUNTER — HOSPITAL ENCOUNTER (EMERGENCY)
Facility: HOSPITAL | Age: 38
Discharge: HOME | End: 2018-08-13
Attending: FAMILY MEDICINE | Admitting: FAMILY MEDICINE

## 2018-08-12 DIAGNOSIS — M54.50 ACUTE BILATERAL LOW BACK PAIN WITHOUT SCIATICA: Primary | ICD-10-CM

## 2018-08-12 LAB
6-ACETYL MORPHINE: NEGATIVE
ALBUMIN SERPL-MCNC: 4.7 G/DL (ref 3.5–5)
ALBUMIN/GLOB SERPL: 1.5 G/DL (ref 1.5–2.5)
ALP SERPL-CCNC: 86 U/L (ref 40–129)
ALT SERPL W P-5'-P-CCNC: 49 U/L (ref 10–44)
AMPHET+METHAMPHET UR QL: POSITIVE
ANION GAP SERPL CALCULATED.3IONS-SCNC: 8.9 MMOL/L (ref 3.6–11.2)
AST SERPL-CCNC: 28 U/L (ref 10–34)
BARBITURATES UR QL SCN: NEGATIVE
BASOPHILS # BLD AUTO: 0.05 10*3/MM3 (ref 0–0.3)
BASOPHILS NFR BLD AUTO: 0.6 % (ref 0–2)
BENZODIAZ UR QL SCN: NEGATIVE
BILIRUB SERPL-MCNC: 0.4 MG/DL (ref 0.2–1.8)
BILIRUB UR QL STRIP: NEGATIVE
BUN BLD-MCNC: 8 MG/DL (ref 7–21)
BUN/CREAT SERPL: 7.1 (ref 7–25)
BUPRENORPHINE SERPL-MCNC: NEGATIVE NG/ML
CALCIUM SPEC-SCNC: 9.6 MG/DL (ref 7.7–10)
CANNABINOIDS SERPL QL: NEGATIVE
CHLORIDE SERPL-SCNC: 105 MMOL/L (ref 99–112)
CK SERPL-CCNC: 292 U/L (ref 24–204)
CLARITY UR: CLEAR
CO2 SERPL-SCNC: 25.1 MMOL/L (ref 24.3–31.9)
COCAINE UR QL: NEGATIVE
COLOR UR: ABNORMAL
CREAT BLD-MCNC: 1.12 MG/DL (ref 0.43–1.29)
DEPRECATED RDW RBC AUTO: 44.6 FL (ref 37–54)
EOSINOPHIL # BLD AUTO: 0.34 10*3/MM3 (ref 0–0.7)
EOSINOPHIL NFR BLD AUTO: 3.9 % (ref 0–5)
ERYTHROCYTE [DISTWIDTH] IN BLOOD BY AUTOMATED COUNT: 13.3 % (ref 11.5–14.5)
ETHANOL BLD-MCNC: <10 MG/DL
ETHANOL UR QL: <0.01 %
GFR SERPL CREATININE-BSD FRML MDRD: 73 ML/MIN/1.73
GLOBULIN UR ELPH-MCNC: 3.2 GM/DL
GLUCOSE BLD-MCNC: 122 MG/DL (ref 70–110)
GLUCOSE UR STRIP-MCNC: NEGATIVE MG/DL
HCT VFR BLD AUTO: 46.4 % (ref 42–52)
HGB BLD-MCNC: 16.4 G/DL (ref 14–18)
HGB UR QL STRIP.AUTO: NEGATIVE
IMM GRANULOCYTES # BLD: 0.03 10*3/MM3 (ref 0–0.03)
IMM GRANULOCYTES NFR BLD: 0.3 % (ref 0–0.5)
KETONES UR QL STRIP: ABNORMAL
LEUKOCYTE ESTERASE UR QL STRIP.AUTO: NEGATIVE
LYMPHOCYTES # BLD AUTO: 4.37 10*3/MM3 (ref 1–3)
LYMPHOCYTES NFR BLD AUTO: 49.5 % (ref 21–51)
MCH RBC QN AUTO: 32.6 PG (ref 27–33)
MCHC RBC AUTO-ENTMCNC: 35.3 G/DL (ref 33–37)
MCV RBC AUTO: 92.2 FL (ref 80–94)
METHADONE UR QL SCN: NEGATIVE
MONOCYTES # BLD AUTO: 0.49 10*3/MM3 (ref 0.1–0.9)
MONOCYTES NFR BLD AUTO: 5.5 % (ref 0–10)
NEUTROPHILS # BLD AUTO: 3.55 10*3/MM3 (ref 1.4–6.5)
NEUTROPHILS NFR BLD AUTO: 40.2 % (ref 30–70)
NITRITE UR QL STRIP: NEGATIVE
NRBC BLD MANUAL-RTO: 0 /100 WBC (ref 0–0)
OPIATES UR QL: POSITIVE
OSMOLALITY SERPL CALC.SUM OF ELEC: 277.2 MOSM/KG (ref 273–305)
OXYCODONE UR QL SCN: NEGATIVE
PCP UR QL SCN: NEGATIVE
PH UR STRIP.AUTO: 5.5 [PH] (ref 5–8)
PLATELET # BLD AUTO: 272 10*3/MM3 (ref 130–400)
PMV BLD AUTO: 11.1 FL (ref 6–10)
POTASSIUM BLD-SCNC: 3.6 MMOL/L (ref 3.5–5.3)
PROT SERPL-MCNC: 7.9 G/DL (ref 6–8)
PROT UR QL STRIP: NEGATIVE
RBC # BLD AUTO: 5.03 10*6/MM3 (ref 4.7–6.1)
SODIUM BLD-SCNC: 139 MMOL/L (ref 135–153)
SP GR UR STRIP: 1.02 (ref 1–1.03)
UROBILINOGEN UR QL STRIP: ABNORMAL
WBC NRBC COR # BLD: 8.83 10*3/MM3 (ref 4.5–12.5)

## 2018-08-12 PROCEDURE — 25010000002 MORPHINE PER 10 MG: Performed by: FAMILY MEDICINE

## 2018-08-12 PROCEDURE — 96374 THER/PROPH/DIAG INJ IV PUSH: CPT

## 2018-08-12 PROCEDURE — 80307 DRUG TEST PRSMV CHEM ANLYZR: CPT | Performed by: FAMILY MEDICINE

## 2018-08-12 PROCEDURE — P9612 CATHETERIZE FOR URINE SPEC: HCPCS

## 2018-08-12 PROCEDURE — 25010000002 ONDANSETRON PER 1 MG: Performed by: FAMILY MEDICINE

## 2018-08-12 PROCEDURE — 96361 HYDRATE IV INFUSION ADD-ON: CPT

## 2018-08-12 PROCEDURE — 74176 CT ABD & PELVIS W/O CONTRAST: CPT | Performed by: RADIOLOGY

## 2018-08-12 PROCEDURE — 80053 COMPREHEN METABOLIC PANEL: CPT | Performed by: FAMILY MEDICINE

## 2018-08-12 PROCEDURE — 85025 COMPLETE CBC W/AUTO DIFF WBC: CPT | Performed by: FAMILY MEDICINE

## 2018-08-12 PROCEDURE — 74176 CT ABD & PELVIS W/O CONTRAST: CPT

## 2018-08-12 PROCEDURE — 82550 ASSAY OF CK (CPK): CPT | Performed by: FAMILY MEDICINE

## 2018-08-12 PROCEDURE — 81003 URINALYSIS AUTO W/O SCOPE: CPT | Performed by: FAMILY MEDICINE

## 2018-08-12 PROCEDURE — 36415 COLL VENOUS BLD VENIPUNCTURE: CPT

## 2018-08-12 PROCEDURE — 99284 EMERGENCY DEPT VISIT MOD MDM: CPT

## 2018-08-12 PROCEDURE — 51798 US URINE CAPACITY MEASURE: CPT

## 2018-08-12 PROCEDURE — 96375 TX/PRO/DX INJ NEW DRUG ADDON: CPT

## 2018-08-12 RX ORDER — ONDANSETRON 2 MG/ML
4 INJECTION INTRAMUSCULAR; INTRAVENOUS ONCE
Status: COMPLETED | OUTPATIENT
Start: 2018-08-12 | End: 2018-08-12

## 2018-08-12 RX ORDER — ORPHENADRINE CITRATE 30 MG/ML
60 INJECTION INTRAMUSCULAR; INTRAVENOUS ONCE
Status: COMPLETED | OUTPATIENT
Start: 2018-08-13 | End: 2018-08-13

## 2018-08-12 RX ORDER — SODIUM CHLORIDE 0.9 % (FLUSH) 0.9 %
1-10 SYRINGE (ML) INJECTION AS NEEDED
Status: DISCONTINUED | OUTPATIENT
Start: 2018-08-12 | End: 2018-08-13 | Stop reason: HOSPADM

## 2018-08-12 RX ADMIN — MORPHINE SULFATE 4 MG: 4 INJECTION INTRAVENOUS at 22:43

## 2018-08-12 RX ADMIN — SODIUM CHLORIDE 1000 ML: 9 INJECTION, SOLUTION INTRAVENOUS at 22:41

## 2018-08-12 RX ADMIN — ONDANSETRON 4 MG: 2 INJECTION, SOLUTION INTRAMUSCULAR; INTRAVENOUS at 22:42

## 2018-08-13 VITALS
HEART RATE: 87 BPM | BODY MASS INDEX: 28.01 KG/M2 | RESPIRATION RATE: 18 BRPM | WEIGHT: 230 LBS | OXYGEN SATURATION: 100 % | DIASTOLIC BLOOD PRESSURE: 68 MMHG | TEMPERATURE: 98 F | SYSTOLIC BLOOD PRESSURE: 124 MMHG | HEIGHT: 76 IN

## 2018-08-13 PROCEDURE — 25010000002 ORPHENADRINE CITRATE PER 60 MG: Performed by: NURSE PRACTITIONER

## 2018-08-13 PROCEDURE — 96375 TX/PRO/DX INJ NEW DRUG ADDON: CPT

## 2018-08-13 RX ORDER — DICLOFENAC SODIUM 75 MG/1
75 TABLET, DELAYED RELEASE ORAL 2 TIMES DAILY PRN
Qty: 20 TABLET | Refills: 0 | Status: SHIPPED | OUTPATIENT
Start: 2018-08-13

## 2018-08-13 RX ORDER — METHOCARBAMOL 750 MG/1
750 TABLET, FILM COATED ORAL 3 TIMES DAILY
Qty: 15 TABLET | Refills: 0 | Status: SHIPPED | OUTPATIENT
Start: 2018-08-13

## 2018-08-13 RX ADMIN — ORPHENADRINE CITRATE 60 MG: 30 INJECTION INTRAMUSCULAR; INTRAVENOUS at 00:07

## 2018-08-13 NOTE — ED PROVIDER NOTES
Subjective   The patient is a 37-year-old male that presents to the ED for bilateral flank pain and low back pain.  The patient says that he has a history of renal failure and he thinks something with his kidneys.  He says that he has been unable to pass urine for the entire day.  He denies a history of kidney stones.  He says that he isn't  and he has been working out hard the past few days        History provided by:  Patient   used: No    Flank Pain   Pain location:  L flank and R flank  Pain quality: stabbing and throbbing    Pain radiates to:  Does not radiate  Pain severity:  Moderate  Onset quality:  Gradual  Duration:  3 days  Timing:  Intermittent  Progression:  Waxing and waning  Chronicity:  Recurrent  Context: not alcohol use, not awakening from sleep, not medication withdrawal, not recent illness and not retching    Relieved by:  Nothing  Worsened by:  Nothing  Ineffective treatments:  None tried  Associated symptoms: no anorexia, no belching, no chest pain, no fever, no flatus, no melena, no nausea and no shortness of breath    Risk factors: no alcohol abuse, has not had multiple surgeries and no NSAID use        Review of Systems   Constitutional: Negative.  Negative for fever.   HENT: Negative.    Eyes: Negative.    Respiratory: Negative.  Negative for shortness of breath.    Cardiovascular: Negative for chest pain.   Gastrointestinal: Negative for anorexia, flatus, melena and nausea.   Genitourinary: Positive for flank pain.   Musculoskeletal: Positive for back pain.   Skin: Negative.    Neurological: Negative.    Hematological: Negative.    Psychiatric/Behavioral: Negative.        Past Medical History:   Diagnosis Date   • Anxiety    • Asthma    • Bipolar disorder (CMS/HCC)    • Depression    • Migraine    • Psychiatric illness    • Schizoaffective disorder (CMS/HCC)    • Schizoaffective schizophrenia (CMS/HCC)    • Suicide attempt        Allergies   Allergen  Reactions   • Penicillins Rash       Past Surgical History:   Procedure Laterality Date   • APPENDECTOMY     • CHOLECYSTECTOMY         Family History   Problem Relation Age of Onset   • Bipolar disorder Mother        Social History     Social History   • Marital status: Unknown     Social History Main Topics   • Smoking status: Former Smoker   • Smokeless tobacco: Current User     Types: Snuff   • Alcohol use No   • Drug use: No   • Sexual activity: Defer     Other Topics Concern   • Not on file           Objective   Physical Exam   Constitutional: He is oriented to person, place, and time. He appears well-developed and well-nourished.   HENT:   Head: Normocephalic and atraumatic.   Eyes: Pupils are equal, round, and reactive to light. EOM are normal.   Neck: Normal range of motion. Neck supple.   Cardiovascular: Normal rate, regular rhythm, normal heart sounds and intact distal pulses.    Pulmonary/Chest: Effort normal and breath sounds normal.   Abdominal: Soft. Bowel sounds are normal. There is tenderness.   Generalized abdominal tenderness   Musculoskeletal: Normal range of motion.   Neurological: He is alert and oriented to person, place, and time.   Skin: Skin is warm and dry. Capillary refill takes less than 2 seconds.   Psychiatric: He has a normal mood and affect. His behavior is normal. Judgment and thought content normal.   Nursing note and vitals reviewed.      Procedures           ED Course  ED Course as of Aug 13 0031   Mon Aug 13, 2018   0028 Nonobstructing renal calculi. No acute findings CT Abdomen Pelvis Stone Protocol [KK]      ED Course User Index  [KK] Meagan Cummins, JULIAN                  MDM  Number of Diagnoses or Management Options  Acute bilateral low back pain without sciatica: new and requires workup     Amount and/or Complexity of Data Reviewed  Clinical lab tests: ordered and reviewed  Tests in the radiology section of CPT®: reviewed and ordered  Tests in the medicine section  of CPT®: reviewed and ordered    Risk of Complications, Morbidity, and/or Mortality  Presenting problems: moderate  Diagnostic procedures: moderate  Management options: moderate    Patient Progress  Patient progress: improved        Final diagnoses:   Acute bilateral low back pain without sciatica            Meagan Cummins, APRN  08/13/18 0031

## 2018-08-13 NOTE — ED NOTES
Bladder scan done at bedside at this time, 75mL detected in bladder, provider made aware.     Jolene Matt RN  08/12/18 8857

## 2018-08-13 NOTE — DISCHARGE INSTRUCTIONS
Call one of the offices below to establish a primary care provider.  If you are unable to get an appointment and feel it is an emergency and need to be seen immediately please return to the Emergency Department.    Call one of the office below to set up a primary care provider.    Dr. Carlos Machado                                                                                                       [address] [facility] [address]  [phone]    Dr. Villegas, Dr. DENISA Anderson, Dr. INA Anderson ([facility])  [address] [facility] [address]  [phone]    Dr. Palm, Dr. Quach, Dr. Pratt ([facility])  [address] [facility] [address]  [phone]    Dr. Lino  [address] [facility] [address]  [phone]    Dr. Woodson, Dr. Flores, Dr. Nixon, Dr. Gonzalez (formerly [facility])  [address] [place] [address] [address]  [facility] [address]  [phone]    Dr. Araceli Rubi  [address] [facility] [place] [address]  [phone]    Dr. Kandi Tong  [address]   [address]  [facility] [address]  [phone]    Dr. Machado  [address] [facility] [address]  [phone]    Dr. Smith, Dr. Sosa  [address] [facility] [place] [address]  [phone]    Dr. Cook  [address] [address]                                                              [address]  [facility] [address]  [phone]    Dr. Gonsalves  [address] [facility] [address] [phone]    [address]. Tawana Malin  [address]  [address]  [facility] [address]  [phone]

## 2018-08-13 NOTE — ED NOTES
Patient is waiting on his ride. Instructed patient that the person who is driving him home needs to sign our discharge papers. Patient verbalizes understanding.      Mary Alvarado, RN  08/13/18 0133